# Patient Record
Sex: FEMALE | Race: WHITE
[De-identification: names, ages, dates, MRNs, and addresses within clinical notes are randomized per-mention and may not be internally consistent; named-entity substitution may affect disease eponyms.]

---

## 2017-02-16 ENCOUNTER — HOSPITAL ENCOUNTER (EMERGENCY)
Dept: HOSPITAL 10 - FTE | Age: 22
LOS: 1 days | Discharge: HOME | End: 2017-02-17
Payer: COMMERCIAL

## 2017-02-16 VITALS
BODY MASS INDEX: 40.62 KG/M2 | BODY MASS INDEX: 40.62 KG/M2 | HEIGHT: 63 IN | WEIGHT: 229.28 LBS | WEIGHT: 229.28 LBS | HEIGHT: 63 IN

## 2017-02-16 DIAGNOSIS — R10.13: Primary | ICD-10-CM

## 2017-02-16 PROCEDURE — 81003 URINALYSIS AUTO W/O SCOPE: CPT

## 2017-02-16 PROCEDURE — 99283 EMERGENCY DEPT VISIT LOW MDM: CPT

## 2017-02-17 VITALS
SYSTOLIC BLOOD PRESSURE: 115 MMHG | RESPIRATION RATE: 18 BRPM | HEART RATE: 86 BPM | DIASTOLIC BLOOD PRESSURE: 73 MMHG | TEMPERATURE: 98.6 F

## 2017-02-17 NOTE — ERD
ER Documentation


Chief Complaint


Date/Time


DATE: 2/17/17 


TIME: 03:02


Chief Complaint


Epigastric pain, left leg shaking, Nausea





HPI


Pleasant 21-year-old female presents to emergency room today for abdominal pain

, nausea, and left leg shakiness.


Patient reports abdominal pain started in epigastrium area and now is felt in 

lower quadrants, patient reports that her left leg started shaking 

involuntarily today.  Patient has been seen by urgent care today diagnosed with 

GERD treated with Zantac.  Patient reports that she is here for reevaluation 

because abdominal pain has changed in quality now feels crampy and tender.  

Also has changed in location now is in the lower quadrants, epigastric pain or 

heartburn not present at this time.


Patient denies vomiting, dysuria, hematuria, fever or chills.  Patient denies 

belching, flatulence, diarrhea or constipation.





ROS


All systems reviewed and are negative except as per history of present illness.





Medications


Home Meds


Active Scripts


Magaldrate/Simethicone* (Mylanta*) 355 Ml Susp, 30 ML PO QID Y for 

GASTROINTESTINAL UPSET, #1 BOTTLE


   Prov:TALISHA SERRANO         2/17/17


Tramadol HCl (Tramadol HCl) 50 Mg Tab, 50 MG PO Q4 Y for PAIN, #10 TAB


   Prov:DEAN MCGREGOR PA-C         6/5/15


Ciprofloxacin Hcl* (Ciprofloxacin Hcl*) 500 Mg Tablet, 500 MG PO BID for 7 Days

, TAB


   Prov:DEAN MCGREGOR PA-C         6/5/15


Reported Medications


Albuterol Sulfate* (Proair HFA*) 8.5 Gm Hfa.aer.ad, 2 PUFF INH Q4H Y for 

WHEEZING AND SOB, INH


   12/18/14





Allergies


Allergies:  


Coded Allergies:  


     No Known Allergies (Verified  Allergy, Mild, 2/16/17)





PMhx/Soc


Medical and Surgical Hx:  pt denies Medical Hx, pt denies Surgical Hx


History of Surgery:  No


Anesthesia Reaction:  No


Hx Neurological Disorder:  No


Hx Respiratory Disorders:  No


Hx Cardiac Disorders:  No


Hx Psychiatric Problems:  No


Hx Miscellaneous Medical Probl:  No


Hx Alcohol Use:  No


Hx Substance Use:  No


Hx Tobacco Use:  No


Smoking Status:  Never smoker





Physical Exam


Vitals





Vital Signs








  Date Time  Temp Pulse Resp B/P Pulse Ox O2 Delivery O2 Flow Rate FiO2


 


2/17/17 00:31 98.6 86 18 115/73 97 Room Air  


 


2/16/17 21:39 100.5 122 20 140/93 97   





Vitals stable, nursing notes reviewed,


Fever reduction effective with Tylenol.


Physical Exam


Const:               No acute distress


Head:   Atraumatic 


Eyes:    Normal Conjunctiva


ENT:    Mucous membranes moist


Neck:               


Resp:   Clear to auscultation bilaterally


Cardio:    Regular rate and rhythm, no murmurs


Abd:    Abdomen symmetric, soft, epigastric tenderness, right upper quadrant 

tenderness, bilateral lower quadrant tenderness, negative Ingram sign. negative 

CVA tenderness.


Skin:    


Back:    No flank tenderness


Ext:    


Neur:    Awake and alert


Psych:    Normal Mood and Affect


Results 24 hrs





 Laboratory Tests








Test


  2/16/17


23:08


 


Bedside Urine Blood Negative 


 


Bedside Urine Glucose (UA) Negative 


 


Bedside Urine Ketones (LAB) Negative 


 


Bedside Urine Leukocyte


Esterase (L Negative 


 


 


Bedside Urine Nitrite (LAB) Negative 


 


Bedside Urine Protein (LAB) Negative 


 


Bedside Urine pH (LAB) 5.5 








 Current Medications








 Medications


  (Trade)  Dose


 Ordered  Sig/Jamey


 Route


 PRN Reason  Start Time


 Stop Time Status Last Admin


Dose Admin


 


 Acetaminophen


  (Tylenol Tab)  650 mg  ONCE  ONCE


 PO


   2/16/17 23:00


 2/16/17 23:01 DC 2/16/17 23:11


 





Interpretation text


UA hCG negative for evidence of pregnancy


Urinalysis negative for leukocytosis or nitrates, negative for RBCs.  Normal 

urinalysis





Procedures/MDM


Pleasant 21-year-old female presents to emergency department for reevaluation 

of abdominal pain.  Patient reports sudden onset of abdominal pain starting in 

her epigastric area today.  Patient was seen and treated at urgent care for 

GERD symptoms.  Patient concerned about infection because of change in 

symptoms.  Urinalysis negative for evidence of infection fever treated with 

Tylenol.  Urinary tract infection or pyelonephritis is not suspected.  Abdomen 

is nonacute, biliary colic, pancreatitis, or cholelithiasis is not suspected.  

Patient is stable for discharge.  Reports improvement of symptoms with Tylenol 

on reevaluation.  I feel patient is a excellent candidate for outpatient 

therapy and should continue Zantac, prescription of Mylanta provided, continue 

over-the-counter Tylenol for generalized fever aches and pains.  I have 

discussed results, and examination findings, and treatment plan with the 

patient prior to discharge, indication for emergent reevaluation discussed.  

Side effects of all medication discussed.  Patient given opportunity to ask 

questions, comfort care discussed patient verbalizes understanding, agrees with 

plan of care..





Departure


Diagnosis:  


 Primary Impression:  


 Abdominal pain


Condition:  Good


Patient Instructions:  Abdominal Pain





Additional Instructions:  


Call your primary care doctor TOMORROW for an appointment during the next 1-2 

days.See the doctor sooner or return here if your condition worsens before your 

appointment time.











TALISHA SERRANO Feb 17, 2017 03:13

## 2017-12-24 ENCOUNTER — HOSPITAL ENCOUNTER (EMERGENCY)
Dept: HOSPITAL 10 - FTE | Age: 22
Discharge: HOME | End: 2017-12-24
Payer: COMMERCIAL

## 2017-12-24 VITALS
WEIGHT: 232.37 LBS | HEIGHT: 66 IN | BODY MASS INDEX: 37.34 KG/M2 | HEIGHT: 66 IN | WEIGHT: 232.37 LBS | BODY MASS INDEX: 37.34 KG/M2

## 2017-12-24 DIAGNOSIS — R51: Primary | ICD-10-CM

## 2017-12-24 DIAGNOSIS — R07.89: ICD-10-CM

## 2017-12-24 PROCEDURE — 71010: CPT

## 2017-12-24 PROCEDURE — 93005 ELECTROCARDIOGRAM TRACING: CPT

## 2017-12-24 NOTE — RADRPT
PROCEDURE:   XR Chest. 

 

CLINICAL INDICATION:   Chest pain, cough 

 

TECHNIQUE:   AP Portable chest. 

 

COMPARISON:   CR CHEST 12/18/2014

 

FINDINGS:

 

 

The cardiomediastinal silhouette is normal. The aorta is normal. No focal consolidation, pleural eff
usion or pneumothorax is seen.   The osseous structures are intact. 

 

 

IMPRESSION:

No radiographic evidence of acute cardiopulmonary disease.

_____________________________________________ 

Physician Karely           Date    Time 

Electronically viewed and signed by Debra Gallo Physician on 12/24/2017 10:12 

 

D:  12/24/2017 10:12  T:  12/24/2017 10:12

CS/

## 2017-12-24 NOTE — ERD
ER Documentation


Chief Complaint


Chief Complaint


HAS A HA AND A STABBING SENSATION ON HER CHEST





HPI


Patient is a 22-year-old female presents ED for concerns of a headache and 

"stabbing sensation" in her chest.  Patient states her headache started 

yesterday.  Patient describes the pain to be in her bilateral temporal regions 

and radiating to the occipital portion of her head.  Patient states she did 

take ibuprofen last night which did alleviate her headache however it has 

returned.  She states her current headache is a 3 out of 10.  Patient denies 

that this is a worse headache of her life.  Patient denies any nausea, vomiting

, blurry vision, phonophobia, photophobia, neck pain, neck stiffness, fever or 

chills.  Patient also admits to left ear pain.  Patient states 1 week ago she 

did have a cough.  Patient states her cough is completely resolved at this 

time.  Patient states this morning she woke up with chest pain.  Patient 

describes pain to be in her anterior chest.  Patient denies any radiation of 

pain.  Patient denies any shortness of breath, left upper extremity pain, left 

upper extremity pain or loss of consciousness.  Patient denies any abdominal 

pain.  Her last menstrual period was on 17.  Patient denies any unilateral 

leg swelling, or history of DVT/PE, recent travel, recent surgeries or OCP use.





ROS


All systems reviewed and are negative except as per history of present illness.





Medications


Home Meds


Active Scripts


Ibuprofen* (Motrin*) 600 Mg Tab, 600 MG PO Q6, #30 TAB


   Prov:TAL RAMOS PA-C         17


Magaldrate/Simethicone* (Mylanta*) 355 Ml Susp, 30 ML PO QID Y for 

GASTROINTESTINAL UPSET, #1 BOTTLE


   Prov:ZACHJACQUESTALISHA         17


Tramadol HCl (Tramadol HCl) 50 Mg Tab, 50 MG PO Q4 Y for PAIN, #10 TAB


   Prov:DEAN MCGREGOR PA-C         6/5/15


Ciprofloxacin Hcl* (Ciprofloxacin Hcl*) 500 Mg Tablet, 500 MG PO BID for 7 Days

, TAB


   Prov:DEAN MCGREGOR PA-C         6/5/15


Reported Medications


Albuterol Sulfate* (Proair HFA*) 8.5 Gm Hfa.aer.ad, 2 PUFF INH Q4H Y for 

WHEEZING AND SOB, INH


   14





Allergies


Allergies:  


Coded Allergies:  


     No Known Allergies (Verified  Allergy, Mild, 17)





PMhx/Soc


History of Surgery:  No


Anesthesia Reaction:  No


Hx Neurological Disorder:  No


Hx Respiratory Disorders:  No


Hx Cardiac Disorders:  No


Hx Psychiatric Problems:  No


Hx Miscellaneous Medical Probl:  No


Hx Alcohol Use:  No


Hx Substance Use:  No


Hx Tobacco Use:  No





Physical Exam


Vitals





Vital Signs








  Date Time  Temp Pulse Resp B/P Pulse Ox O2 Delivery O2 Flow Rate FiO2


 


17 08:32 97.4 70 18 140/88 98   








Physical Exam


GENERAL: Well-developed, well-nourished female. Appears in no acute distress.


HEAD: Normocephalic, atraumatic. No deformities or ecchymosis. 


EYE: Pupils equal, round, and reactive to light. EOMs intact. No conjunctival 

erythema. No eye discharge. 


ENT: External ear without any masses or tenderness. Auditory canals clear 

bilaterally. TM visualized bilaterally, non-erythematous, non-bulging. Nasal 

mucosa pink with no discharge. Oropharynx is pink without any tonsillar 

erythema or exudates. No uvula deviation. No kissing tonsils. 


NECK: Supple. No meningismus. Normal ROM of the neck.


LUNG: Clear to auscultation bilaterally. No rhonchi, wheezing, rales or coarse 

breath sounds. 


CHEST: Tender to palpation of the anterior chest wall.  Pain is reproducible.


HEART: Regular rate and rhythm. No murmurs, rubs or gallops.


EXTREMITIES: Equal pulses bilaterally. No peripheral clubbing, cyanosis or 

edema. No unilateral leg swelling.  


NEUROLOGIC: Alert and oriented x3, cooperative. Mood and affect appropriate to 

situation. Cranial nerves II through XII are grossly intact. Normal speech. 

Motor exam: 5/5 strength in upper and lower extremities. Sensory exam: 

Sensation intact to light touch on all four extremities. Cerebellar function 

exam: No dysmetria on finger-to-nose test. Steady gait. No pronator drift.


SKIN: Normal color. Warm and dry. No rashes or lesions.


Results 24 hrs





 Current Medications








 Medications


  (Trade)  Dose


 Ordered  Sig/Jamey


 Route


 PRN Reason  Start Time


 Stop Time Status Last Admin


Dose Admin


 


 Acetaminophen/


 Butalbital/


 Caffeine


  (Fioricet)  1 tab  ONCE  ONCE


 PO


   17 09:30


 17 09:31 DC 17 09:34


 











Procedures/MDM


ED COURSE:


The patient was stable throughout ED course. I kept the patient and/or family 

informed of laboratory and diagnostic imaging results throughout the ED course.

  





EKG:


Read by Dr. Wilson, attending physician.


EKG shows normal sinus rhythm at a rate of 69 bpm.


No arrhythmias, acute ST elevations or T wave changes were noted.


 


DIAGNOSTIC IMAGING:


Read by radiologist.


 Patient: MARIELLE DOW   : 1995   Age: 22  Sex: F                 

       


 MR #:    [id number]   Acct #:   [id number]    DOS: 17 0919


 Ordering MD: TAL RAMOS PA-C   Location:  [facility]   Room/Bed:                 

                           


 








PROCEDURE:   XR Chest. 


 


CLINICAL INDICATION:   Chest pain, cough 


 


TECHNIQUE:   AP Portable chest. 


 


COMPARISON:   CR CHEST 2014


 


FINDINGS:


 


 


The cardiomediastinal silhouette is normal. The aorta is normal. No focal 

consolidation, pleural effusion or pneumothorax is seen.   The osseous 

structures are intact. 


 


 


IMPRESSION:


No radiographic evidence of acute cardiopulmonary disease.


_____________________________________________ 


Physician Karely           Date    Time 


Electronically viewed and signed by Physician Karely on 2017 10:

12 


 


D:  2017 10:12  T:  2017 10:12


CS/





CC: TAL RAMOS PA-C








PROCEDURES:


None.





MEDICATIONS GIVEN: 


Fioricet


Patient tolerated medication well with no adverse reactions. Patient reported 

improvement in pain. 








MEDICAL DECISION MAKING:


This is a 22-year-old female presents ED for concerns of a headache and pain in 

her chest.  Patient does admit to having a cough approximately 1 week ago which 

is completely resolved at this time.  Vital signs were reviewed. Patient was 

afebrile. Patient is not hypoxic. Patient denied any fevers, neck stiffness, 

visual changes or LOC. Full neurological exam was normal.  Patient was given 

Fioricet for headache.  Patient reported significant alleviation of her pain 

after receiving this medication.  Patient's EKG showed normal sinus rhythm at a 

rate of 69 bpm.  EKG was reviewed by ED attending.  Chest x-ray was 

unremarkable.  Patient's PERC score is 0.  Low suspicion for PE.  At this time, 

patient's presentation is most consistent with tension headache and chest wall 

pain.  Patient's chest wall pain is likely due to recent coughing that she had.

  Low suspicion for intra-cranial hemorrhage, meningitis, temporal arteritis, 

intracranial mass, sinusitis, pneumonia, pneumothorax, pleural effusion, CHF, 

ACS, pericarditis, arrhythmia.  Patient was nontoxic, non-ill-appearing prior 

to discharge.  Patient was noted to be on her cell phone talking in the results 

waiting room without any signs of acute distress.








PRESCRIPTIONS:


Ibuprofen





DISCHARGE:


At this time, patient is stable for discharge and outpatient management. I have 

encouraged the patient to hydrate well. I have instructed the patient to follow-

up with his/her primary care physician in 1-2 days. If symptoms persist, 

patient may need to see a specialist for further examinations and testing. I 

have instructed the patient to promptly return to the ER at any time for any 

new or worsening symptoms including increased increased pain, fever, nausea, 

vomiting, numbness, neck stiffness, visual changes, weakness or LOC. The 

patient and/or family expressed understanding of and agreement with this plan. 

All questions were answered. Home care instructions were provided. 





Patients blood pressure was elevated (>120/80) but appears stable without 

evidence of hypertensive emergency, hypertensive urgency or end-organ failure. 

I had discussion with the patient about the risks of hypertension. I have 

advised the patient to follow up with his/her primary care physician for 

outpatient monitoring and treatment for hypertension in 2-3 days. I have 

instructed the patient to return to the ER for any new or worsening symptoms 

including chest pain, shortness of breath, headache, blurred vision, confusion, 

nausea, vomiting or LOC. 








Disclaimer: Inadvertent spelling and grammatical errors are likely due to EHR/

dictation software use and do not reflect on the overall quality of patient 

care. Also, please note that the electronic time recorded on this note does not 

necessarily reflect the actual time of the patient encounter.





Departure


Diagnosis:  


 Primary Impression:  


 Headache


 Headache type:  unspecified  Headache chronicity pattern:  unspecified pattern

  Intractability:  not intractable  Qualified Code:  R51 - Nonintractable 

headache, unspecified chronicity pattern, unspecified headache type


 Additional Impression:  


 Chest wall pain


Condition:  Stable


Patient Instructions:  Self-Care for Headaches, Chest Wall Pain, Costochondritis





Additional Instructions:  


Call your primary care doctor TOMORROW for an appointment during the next 1-2 

days.See the doctor sooner or return here if your condition worsens before your 

appointment time.











TAL RAMOS PA-C Dec 24, 2017 10:44

## 2018-07-16 ENCOUNTER — HOSPITAL ENCOUNTER (EMERGENCY)
Age: 23
LOS: 1 days | Discharge: HOME | End: 2018-07-17

## 2018-07-16 ENCOUNTER — HOSPITAL ENCOUNTER (EMERGENCY)
Dept: HOSPITAL 91 - FTE | Age: 23
LOS: 1 days | Discharge: HOME | End: 2018-07-17
Payer: COMMERCIAL

## 2018-07-16 DIAGNOSIS — J06.9: Primary | ICD-10-CM

## 2018-07-16 PROCEDURE — 99283 EMERGENCY DEPT VISIT LOW MDM: CPT

## 2018-07-16 PROCEDURE — 87880 STREP A ASSAY W/OPTIC: CPT

## 2018-12-20 ENCOUNTER — HOSPITAL ENCOUNTER (EMERGENCY)
Dept: HOSPITAL 10 - FTE | Age: 23
Discharge: HOME | End: 2018-12-20
Payer: COMMERCIAL

## 2018-12-20 ENCOUNTER — HOSPITAL ENCOUNTER (EMERGENCY)
Dept: HOSPITAL 91 - FTE | Age: 23
Discharge: HOME | End: 2018-12-20
Payer: COMMERCIAL

## 2018-12-20 VITALS — RESPIRATION RATE: 18 BRPM | DIASTOLIC BLOOD PRESSURE: 84 MMHG | HEART RATE: 90 BPM | SYSTOLIC BLOOD PRESSURE: 141 MMHG

## 2018-12-20 VITALS
BODY MASS INDEX: 42.97 KG/M2 | BODY MASS INDEX: 42.97 KG/M2 | HEIGHT: 63 IN | WEIGHT: 242.51 LBS | HEIGHT: 63 IN | WEIGHT: 242.51 LBS

## 2018-12-20 DIAGNOSIS — R10.9: ICD-10-CM

## 2018-12-20 DIAGNOSIS — J32.9: Primary | ICD-10-CM

## 2018-12-20 LAB
ADD UMIC: NO
UR ASCORBIC ACID: NEGATIVE MG/DL
UR BILIRUBIN (DIP): NEGATIVE MG/DL
UR BLOOD (DIP): NEGATIVE MG/DL
UR CLARITY: CLEAR
UR COLOR: YELLOW
UR GLUCOSE (DIP): NEGATIVE MG/DL
UR KETONES (DIP): NEGATIVE MG/DL
UR LEUKOCYTE ESTERASE (DIP): NEGATIVE LEU/UL
UR NITRITE (DIP): NEGATIVE MG/DL
UR PH (DIP): 6 (ref 5–9)
UR SPECIFIC GRAVITY (DIP): 1.01 (ref 1–1.03)
UR TOTAL PROTEIN (DIP): NEGATIVE MG/DL
UR UROBILINOGEN (DIP): NEGATIVE MG/DL

## 2018-12-20 PROCEDURE — 81003 URINALYSIS AUTO W/O SCOPE: CPT

## 2018-12-20 PROCEDURE — 87086 URINE CULTURE/COLONY COUNT: CPT

## 2018-12-20 PROCEDURE — 81025 URINE PREGNANCY TEST: CPT

## 2018-12-20 PROCEDURE — 99283 EMERGENCY DEPT VISIT LOW MDM: CPT

## 2018-12-24 ENCOUNTER — HOSPITAL ENCOUNTER (EMERGENCY)
Age: 23
Discharge: HOME | End: 2018-12-24

## 2018-12-24 ENCOUNTER — HOSPITAL ENCOUNTER (EMERGENCY)
Dept: HOSPITAL 91 - FTE | Age: 23
Discharge: HOME | End: 2018-12-24
Payer: COMMERCIAL

## 2018-12-24 DIAGNOSIS — M54.41: Primary | ICD-10-CM

## 2018-12-24 LAB
URINE PH (DIP) POC: 5.5 (ref 5–8.5)
URINE TOTAL PROTEIN POC: (no result)

## 2018-12-24 PROCEDURE — 96372 THER/PROPH/DIAG INJ SC/IM: CPT

## 2018-12-24 PROCEDURE — 81003 URINALYSIS AUTO W/O SCOPE: CPT

## 2018-12-24 PROCEDURE — 99284 EMERGENCY DEPT VISIT MOD MDM: CPT

## 2018-12-24 PROCEDURE — 81025 URINE PREGNANCY TEST: CPT

## 2018-12-24 RX ADMIN — KETOROLAC TROMETHAMINE 1 MG: 30 INJECTION, SOLUTION INTRAMUSCULAR at 05:42
